# Patient Record
Sex: FEMALE | Race: WHITE | NOT HISPANIC OR LATINO | Employment: FULL TIME | URBAN - METROPOLITAN AREA
[De-identification: names, ages, dates, MRNs, and addresses within clinical notes are randomized per-mention and may not be internally consistent; named-entity substitution may affect disease eponyms.]

---

## 2018-04-17 PROBLEM — F41.9 ANXIETY: Status: ACTIVE | Noted: 2018-04-17

## 2021-04-16 ENCOUNTER — HOSPITAL ENCOUNTER (OUTPATIENT)
Dept: LAB | Facility: MEDICAL CENTER | Age: 26
End: 2021-04-16
Attending: INTERNAL MEDICINE
Payer: COMMERCIAL

## 2021-04-16 PROCEDURE — 36415 COLL VENOUS BLD VENIPUNCTURE: CPT

## 2021-04-16 PROCEDURE — 86357 NK CELLS TOTAL COUNT: CPT

## 2021-04-16 PROCEDURE — 80053 COMPREHEN METABOLIC PANEL: CPT

## 2021-04-16 PROCEDURE — 86359 T CELLS TOTAL COUNT: CPT

## 2021-04-16 PROCEDURE — 86790 VIRUS ANTIBODY NOS: CPT

## 2021-04-16 PROCEDURE — 87040 BLOOD CULTURE FOR BACTERIA: CPT

## 2021-04-16 PROCEDURE — 84439 ASSAY OF FREE THYROXINE: CPT

## 2021-04-16 PROCEDURE — 84481 FREE ASSAY (FT-3): CPT

## 2021-04-16 PROCEDURE — 84165 PROTEIN E-PHORESIS SERUM: CPT

## 2021-04-16 PROCEDURE — 85025 COMPLETE CBC W/AUTO DIFF WBC: CPT

## 2021-04-16 PROCEDURE — 84155 ASSAY OF PROTEIN SERUM: CPT

## 2021-04-16 PROCEDURE — 86769 SARS-COV-2 COVID-19 ANTIBODY: CPT

## 2021-04-16 PROCEDURE — 85652 RBC SED RATE AUTOMATED: CPT

## 2021-04-16 PROCEDURE — 86356 MONONUCLEAR CELL ANTIGEN: CPT

## 2021-04-16 PROCEDURE — 86140 C-REACTIVE PROTEIN: CPT

## 2021-04-17 LAB
ALBUMIN SERPL BCP-MCNC: 4.7 G/DL (ref 3.2–4.9)
ALBUMIN/GLOB SERPL: 1.6 G/DL
ALP SERPL-CCNC: 70 U/L (ref 30–99)
ALT SERPL-CCNC: 13 U/L (ref 2–50)
ANION GAP SERPL CALC-SCNC: 11 MMOL/L (ref 7–16)
AST SERPL-CCNC: 14 U/L (ref 12–45)
BASOPHILS # BLD AUTO: 1.4 % (ref 0–1.8)
BASOPHILS # BLD: 0.11 K/UL (ref 0–0.12)
BILIRUB SERPL-MCNC: 0.2 MG/DL (ref 0.1–1.5)
BUN SERPL-MCNC: 14 MG/DL (ref 8–22)
CALCIUM SERPL-MCNC: 9.3 MG/DL (ref 8.5–10.5)
CHLORIDE SERPL-SCNC: 105 MMOL/L (ref 96–112)
CO2 SERPL-SCNC: 22 MMOL/L (ref 20–33)
CREAT SERPL-MCNC: 0.92 MG/DL (ref 0.5–1.4)
CRP SERPL HS-MCNC: <0.3 MG/DL (ref 0–0.75)
EOSINOPHIL # BLD AUTO: 0.14 K/UL (ref 0–0.51)
EOSINOPHIL NFR BLD: 1.8 % (ref 0–6.9)
ERYTHROCYTE [DISTWIDTH] IN BLOOD BY AUTOMATED COUNT: 42.1 FL (ref 35.9–50)
ERYTHROCYTE [SEDIMENTATION RATE] IN BLOOD BY WESTERGREN METHOD: <1 MM/HOUR (ref 0–20)
FASTING STATUS PATIENT QL REPORTED: NORMAL
GLOBULIN SER CALC-MCNC: 3 G/DL (ref 1.9–3.5)
GLUCOSE SERPL-MCNC: 81 MG/DL (ref 65–99)
HCT VFR BLD AUTO: 47 % (ref 37–47)
HGB BLD-MCNC: 15.5 G/DL (ref 12–16)
IMM GRANULOCYTES # BLD AUTO: 0.02 K/UL (ref 0–0.11)
IMM GRANULOCYTES NFR BLD AUTO: 0.3 % (ref 0–0.9)
LYMPHOCYTES # BLD AUTO: 2.34 K/UL (ref 1–4.8)
LYMPHOCYTES NFR BLD: 29.8 % (ref 22–41)
MCH RBC QN AUTO: 31.5 PG (ref 27–33)
MCHC RBC AUTO-ENTMCNC: 33 G/DL (ref 33.6–35)
MCV RBC AUTO: 95.5 FL (ref 81.4–97.8)
MONOCYTES # BLD AUTO: 0.53 K/UL (ref 0–0.85)
MONOCYTES NFR BLD AUTO: 6.7 % (ref 0–13.4)
NEUTROPHILS # BLD AUTO: 4.72 K/UL (ref 2–7.15)
NEUTROPHILS NFR BLD: 60 % (ref 44–72)
NRBC # BLD AUTO: 0 K/UL
NRBC BLD-RTO: 0 /100 WBC
PLATELET # BLD AUTO: 276 K/UL (ref 164–446)
PMV BLD AUTO: 11.9 FL (ref 9–12.9)
POTASSIUM SERPL-SCNC: 4.2 MMOL/L (ref 3.6–5.5)
PROT SERPL-MCNC: 7.7 G/DL (ref 6–8.2)
RBC # BLD AUTO: 4.92 M/UL (ref 4.2–5.4)
SARS-COV-2 AB SERPL QL IA: NORMAL
SODIUM SERPL-SCNC: 138 MMOL/L (ref 135–145)
T3FREE SERPL-MCNC: 3.3 PG/ML (ref 2–4.4)
T4 FREE SERPL-MCNC: 1.11 NG/DL (ref 0.93–1.7)
WBC # BLD AUTO: 7.9 K/UL (ref 4.8–10.8)

## 2021-04-18 LAB — TEST NAME 95000: ABNORMAL

## 2021-04-19 ENCOUNTER — HOSPITAL ENCOUNTER (OUTPATIENT)
Dept: LAB | Facility: MEDICAL CENTER | Age: 26
End: 2021-04-19
Attending: INTERNAL MEDICINE
Payer: COMMERCIAL

## 2021-04-19 PROCEDURE — 36415 COLL VENOUS BLD VENIPUNCTURE: CPT

## 2021-04-19 PROCEDURE — 86359 T CELLS TOTAL COUNT: CPT

## 2021-04-19 PROCEDURE — 86357 NK CELLS TOTAL COUNT: CPT

## 2021-04-19 PROCEDURE — 87799 DETECT AGENT NOS DNA QUANT: CPT

## 2021-04-19 PROCEDURE — 86355 B CELLS TOTAL COUNT: CPT

## 2021-04-19 PROCEDURE — 87040 BLOOD CULTURE FOR BACTERIA: CPT

## 2021-04-19 PROCEDURE — 86360 T CELL ABSOLUTE COUNT/RATIO: CPT

## 2021-04-20 LAB
ALBUMIN SERPL ELPH-MCNC: 4.23 G/DL (ref 3.75–5.01)
ALPHA1 GLOB SERPL ELPH-MCNC: 0.3 G/DL (ref 0.19–0.46)
ALPHA2 GLOB SERPL ELPH-MCNC: 0.84 G/DL (ref 0.48–1.05)
B-GLOBULIN SERPL ELPH-MCNC: 0.84 G/DL (ref 0.48–1.1)
EER PROT ELECT SER Q1092: NORMAL
GAMMA GLOB SERPL ELPH-MCNC: 1.2 G/DL (ref 0.62–1.51)
INTERPRETATION SERPL IFE-IMP: NORMAL
PROT SERPL-MCNC: 7.4 G/DL (ref 6.3–8.2)

## 2021-04-21 LAB
ANNOTATION COMMENT IMP: NORMAL
CD19 CELLS NFR SPEC: 13 % (ref 6–23)
CD3 CELLS # BLD: 1644 CELLS/UL (ref 570–2400)
CD3 CELLS NFR SPEC: 81 % (ref 62–87)
CD3+CD4+ CELLS # BLD: 1007 CELLS/UL (ref 430–1800)
CD3+CD4+ CELLS NFR BLD: 50 % (ref 32–64)
CD3+CD4+ CELLS/CD3+CD8+ CLL BLD: 1.85 RATIO (ref 0.8–3.9)
CD3+CD8+ CELLS # BLD: 548 CELLS/UL (ref 210–1200)
CD3+CD8+ CELLS NFR SPEC: 27 % (ref 15–46)
CD3-CD16+CD56+ CELLS # SPEC: 93 CELLS/UL (ref 78–470)
CD3-CD16+CD56+ CELLS NFR SPEC: 5 % (ref 4–26)
CELLS.CD3-CD19+ [#/VOLUME] IN BLOOD: 273 CELLS/UL (ref 91–610)

## 2021-04-22 LAB
BACTERIA BLD CULT: NORMAL
HHV6 IGG TITR SER IF: ABNORMAL {TITER}
SIGNIFICANT IND 70042: NORMAL
SITE SITE: NORMAL
SOURCE SOURCE: NORMAL

## 2021-04-25 LAB
BACTERIA BLD CULT: NORMAL
SIGNIFICANT IND 70042: NORMAL
SITE SITE: NORMAL
SOURCE SOURCE: NORMAL

## 2021-04-27 LAB — TEST NAME 95000: NORMAL

## 2021-05-12 ENCOUNTER — HOSPITAL ENCOUNTER (OUTPATIENT)
Dept: LAB | Facility: MEDICAL CENTER | Age: 26
End: 2021-05-12
Attending: INTERNAL MEDICINE
Payer: COMMERCIAL

## 2021-05-12 LAB
APTT PPP: 29.3 SEC (ref 24.7–36)
BASOPHILS # BLD AUTO: 1 % (ref 0–1.8)
BASOPHILS # BLD: 0.06 K/UL (ref 0–0.12)
EOSINOPHIL # BLD AUTO: 0.06 K/UL (ref 0–0.51)
EOSINOPHIL NFR BLD: 1 % (ref 0–6.9)
ERYTHROCYTE [DISTWIDTH] IN BLOOD BY AUTOMATED COUNT: 39.7 FL (ref 35.9–50)
HCT VFR BLD AUTO: 44.2 % (ref 37–47)
HGB BLD-MCNC: 14.9 G/DL (ref 12–16)
IMM GRANULOCYTES # BLD AUTO: 0.02 K/UL (ref 0–0.11)
IMM GRANULOCYTES NFR BLD AUTO: 0.3 % (ref 0–0.9)
INR PPP: 1.04 (ref 0.87–1.13)
LYMPHOCYTES # BLD AUTO: 1.67 K/UL (ref 1–4.8)
LYMPHOCYTES NFR BLD: 27.7 % (ref 22–41)
MCH RBC QN AUTO: 31.2 PG (ref 27–33)
MCHC RBC AUTO-ENTMCNC: 33.7 G/DL (ref 33.6–35)
MCV RBC AUTO: 92.5 FL (ref 81.4–97.8)
MONOCYTES # BLD AUTO: 0.4 K/UL (ref 0–0.85)
MONOCYTES NFR BLD AUTO: 6.6 % (ref 0–13.4)
NEUTROPHILS # BLD AUTO: 3.81 K/UL (ref 2–7.15)
NEUTROPHILS NFR BLD: 63.4 % (ref 44–72)
NRBC # BLD AUTO: 0 K/UL
NRBC BLD-RTO: 0 /100 WBC
PLATELET # BLD AUTO: 269 K/UL (ref 164–446)
PMV BLD AUTO: 10.6 FL (ref 9–12.9)
PROTHROMBIN TIME: 13.3 SEC (ref 12–14.6)
RBC # BLD AUTO: 4.78 M/UL (ref 4.2–5.4)
WBC # BLD AUTO: 6 K/UL (ref 4.8–10.8)

## 2021-05-12 PROCEDURE — 85025 COMPLETE CBC W/AUTO DIFF WBC: CPT

## 2021-05-12 PROCEDURE — 36415 COLL VENOUS BLD VENIPUNCTURE: CPT

## 2021-05-12 PROCEDURE — 85610 PROTHROMBIN TIME: CPT

## 2021-05-12 PROCEDURE — 85730 THROMBOPLASTIN TIME PARTIAL: CPT

## 2021-05-14 ENCOUNTER — HOSPITAL ENCOUNTER (OUTPATIENT)
Dept: RADIOLOGY | Facility: MEDICAL CENTER | Age: 26
End: 2021-05-14
Attending: INTERNAL MEDICINE
Payer: COMMERCIAL

## 2021-05-14 ENCOUNTER — HOSPITAL ENCOUNTER (OUTPATIENT)
Dept: LAB | Facility: MEDICAL CENTER | Age: 26
End: 2021-05-14
Attending: INTERNAL MEDICINE
Payer: COMMERCIAL

## 2021-05-14 DIAGNOSIS — G35 MULTIPLE SCLEROSIS (HCC): ICD-10-CM

## 2021-05-14 LAB
APTT PPP: 25 SEC (ref 24.7–36)
BASOPHILS # BLD AUTO: 1.2 % (ref 0–1.8)
BASOPHILS # BLD: 0.07 K/UL (ref 0–0.12)
BURR CELLS/RBC NFR CSF MANUAL: 0 %
CLARITY CSF: CLEAR
COLOR CSF: COLORLESS
COLOR SPUN CSF: COLORLESS
EOSINOPHIL # BLD AUTO: 0.1 K/UL (ref 0–0.51)
EOSINOPHIL NFR BLD: 1.7 % (ref 0–6.9)
ERYTHROCYTE [DISTWIDTH] IN BLOOD BY AUTOMATED COUNT: 40.4 FL (ref 35.9–50)
GLUCOSE CSF-MCNC: 58 MG/DL (ref 40–80)
HCT VFR BLD AUTO: 42 % (ref 37–47)
HGB BLD-MCNC: 14.2 G/DL (ref 12–16)
IMM GRANULOCYTES # BLD AUTO: 0.02 K/UL (ref 0–0.11)
IMM GRANULOCYTES NFR BLD AUTO: 0.3 % (ref 0–0.9)
INR PPP: 1.02 (ref 0.87–1.13)
LACTATE BLD-SCNC: 1.4 MMOL/L (ref 0.5–2)
LYMPHOCYTES # BLD AUTO: 1.93 K/UL (ref 1–4.8)
LYMPHOCYTES NFR BLD: 32.1 % (ref 22–41)
LYMPHOCYTES NFR CSF: 92 %
MCH RBC QN AUTO: 31.3 PG (ref 27–33)
MCHC RBC AUTO-ENTMCNC: 33.8 G/DL (ref 33.6–35)
MCV RBC AUTO: 92.7 FL (ref 81.4–97.8)
MONOCYTES # BLD AUTO: 0.45 K/UL (ref 0–0.85)
MONOCYTES NFR BLD AUTO: 7.5 % (ref 0–13.4)
MONONUC CELLS NFR CSF: 8 %
NEUTROPHILS # BLD AUTO: 3.44 K/UL (ref 2–7.15)
NEUTROPHILS NFR BLD: 57.2 % (ref 44–72)
NRBC # BLD AUTO: 0 K/UL
NRBC BLD-RTO: 0 /100 WBC
PLATELET # BLD AUTO: 259 K/UL (ref 164–446)
PMV BLD AUTO: 10.9 FL (ref 9–12.9)
PROT CSF-MCNC: 21 MG/DL (ref 15–45)
PROTHROMBIN TIME: 13.1 SEC (ref 12–14.6)
RBC # BLD AUTO: 4.53 M/UL (ref 4.2–5.4)
RBC # CSF: <1 CELLS/UL
SPECIMEN VOL CSF: 18 ML
TUBE # CSF: 4
TUBE # CSF: 4
WBC # BLD AUTO: 6 K/UL (ref 4.8–10.8)
WBC # CSF: 1 CELLS/UL (ref 0–10)

## 2021-05-14 PROCEDURE — 85610 PROTHROMBIN TIME: CPT

## 2021-05-14 PROCEDURE — 87533 HHV-6 DNA QUANT: CPT

## 2021-05-14 PROCEDURE — 88108 CYTOPATH CONCENTRATE TECH: CPT

## 2021-05-14 PROCEDURE — 82784 ASSAY IGA/IGD/IGG/IGM EACH: CPT

## 2021-05-14 PROCEDURE — 82945 GLUCOSE OTHER FLUID: CPT

## 2021-05-14 PROCEDURE — 306637 DX-LUMBAR PUNCTURE FOR DIAGNOSIS

## 2021-05-14 PROCEDURE — 82040 ASSAY OF SERUM ALBUMIN: CPT

## 2021-05-14 PROCEDURE — 62270 DX LMBR SPI PNXR: CPT

## 2021-05-14 PROCEDURE — 87799 DETECT AGENT NOS DNA QUANT: CPT

## 2021-05-14 PROCEDURE — 83916 OLIGOCLONAL BANDS: CPT

## 2021-05-14 PROCEDURE — 89051 BODY FLUID CELL COUNT: CPT

## 2021-05-14 PROCEDURE — 82042 OTHER SOURCE ALBUMIN QUAN EA: CPT

## 2021-05-14 PROCEDURE — 36415 COLL VENOUS BLD VENIPUNCTURE: CPT

## 2021-05-14 PROCEDURE — 62328 DX LMBR SPI PNXR W/FLUOR/CT: CPT

## 2021-05-14 PROCEDURE — 85025 COMPLETE CBC W/AUTO DIFF WBC: CPT

## 2021-05-14 PROCEDURE — 83605 ASSAY OF LACTIC ACID: CPT

## 2021-05-14 PROCEDURE — 84157 ASSAY OF PROTEIN OTHER: CPT

## 2021-05-14 PROCEDURE — 85730 THROMBOPLASTIN TIME PARTIAL: CPT

## 2021-05-14 RX ORDER — LIDOCAINE HYDROCHLORIDE 10 MG/ML
INJECTION, SOLUTION INFILTRATION; PERINEURAL
Status: DISCONTINUED
Start: 2021-05-14 | End: 2021-05-15 | Stop reason: HOSPADM

## 2021-05-16 ENCOUNTER — HOSPITAL ENCOUNTER (EMERGENCY)
Facility: MEDICAL CENTER | Age: 26
End: 2021-05-17
Attending: EMERGENCY MEDICINE
Payer: COMMERCIAL

## 2021-05-16 DIAGNOSIS — G44.89 OTHER HEADACHE SYNDROME: ICD-10-CM

## 2021-05-16 PROCEDURE — 700111 HCHG RX REV CODE 636 W/ 250 OVERRIDE (IP): Performed by: EMERGENCY MEDICINE

## 2021-05-16 PROCEDURE — 99284 EMERGENCY DEPT VISIT MOD MDM: CPT

## 2021-05-16 PROCEDURE — 700105 HCHG RX REV CODE 258: Performed by: EMERGENCY MEDICINE

## 2021-05-16 PROCEDURE — 96374 THER/PROPH/DIAG INJ IV PUSH: CPT

## 2021-05-16 PROCEDURE — 96375 TX/PRO/DX INJ NEW DRUG ADDON: CPT

## 2021-05-16 RX ORDER — MORPHINE SULFATE 4 MG/ML
4 INJECTION, SOLUTION INTRAMUSCULAR; INTRAVENOUS ONCE
Status: COMPLETED | OUTPATIENT
Start: 2021-05-16 | End: 2021-05-16

## 2021-05-16 RX ORDER — FLUOXETINE HYDROCHLORIDE 20 MG/1
60 CAPSULE ORAL DAILY
Status: SHIPPED | COMMUNITY
End: 2024-02-14

## 2021-05-16 RX ORDER — KETOROLAC TROMETHAMINE 30 MG/ML
30 INJECTION, SOLUTION INTRAMUSCULAR; INTRAVENOUS ONCE
Status: DISCONTINUED | OUTPATIENT
Start: 2021-05-16 | End: 2021-05-16

## 2021-05-16 RX ORDER — SODIUM CHLORIDE 9 MG/ML
1000 INJECTION, SOLUTION INTRAVENOUS ONCE
Status: COMPLETED | OUTPATIENT
Start: 2021-05-16 | End: 2021-05-17

## 2021-05-16 RX ORDER — ONDANSETRON 2 MG/ML
4 INJECTION INTRAMUSCULAR; INTRAVENOUS ONCE
Status: COMPLETED | OUTPATIENT
Start: 2021-05-16 | End: 2021-05-16

## 2021-05-16 RX ADMIN — SODIUM CHLORIDE 1000 ML: 9 INJECTION, SOLUTION INTRAVENOUS at 23:22

## 2021-05-16 RX ADMIN — ONDANSETRON 4 MG: 2 INJECTION INTRAMUSCULAR; INTRAVENOUS at 23:23

## 2021-05-16 RX ADMIN — MORPHINE SULFATE 4 MG: 4 INJECTION INTRAVENOUS at 23:24

## 2021-05-16 ASSESSMENT — FIBROSIS 4 INDEX: FIB4 SCORE: 0.37

## 2021-05-16 ASSESSMENT — PAIN DESCRIPTION - PAIN TYPE: TYPE: ACUTE PAIN

## 2021-05-17 ENCOUNTER — ANESTHESIA (OUTPATIENT)
Dept: ANESTHESIOLOGY | Facility: MEDICAL CENTER | Age: 26
End: 2021-05-17
Payer: COMMERCIAL

## 2021-05-17 ENCOUNTER — ANESTHESIA EVENT (OUTPATIENT)
Dept: ANESTHESIOLOGY | Facility: MEDICAL CENTER | Age: 26
End: 2021-05-17
Payer: COMMERCIAL

## 2021-05-17 ENCOUNTER — HOSPITAL ENCOUNTER (EMERGENCY)
Facility: MEDICAL CENTER | Age: 26
End: 2021-05-17
Attending: EMERGENCY MEDICINE
Payer: COMMERCIAL

## 2021-05-17 VITALS
OXYGEN SATURATION: 98 % | WEIGHT: 133.6 LBS | BODY MASS INDEX: 22.26 KG/M2 | TEMPERATURE: 97.8 F | DIASTOLIC BLOOD PRESSURE: 60 MMHG | HEIGHT: 65 IN | RESPIRATION RATE: 15 BRPM | SYSTOLIC BLOOD PRESSURE: 105 MMHG | HEART RATE: 72 BPM

## 2021-05-17 VITALS
DIASTOLIC BLOOD PRESSURE: 81 MMHG | SYSTOLIC BLOOD PRESSURE: 131 MMHG | BODY MASS INDEX: 21.66 KG/M2 | WEIGHT: 130 LBS | TEMPERATURE: 96.4 F | OXYGEN SATURATION: 100 % | HEIGHT: 65 IN | RESPIRATION RATE: 16 BRPM | HEART RATE: 77 BPM

## 2021-05-17 DIAGNOSIS — R51.9 NONINTRACTABLE HEADACHE, UNSPECIFIED CHRONICITY PATTERN, UNSPECIFIED HEADACHE TYPE: ICD-10-CM

## 2021-05-17 PROCEDURE — 700105 HCHG RX REV CODE 258: Performed by: EMERGENCY MEDICINE

## 2021-05-17 PROCEDURE — 96375 TX/PRO/DX INJ NEW DRUG ADDON: CPT

## 2021-05-17 PROCEDURE — 96365 THER/PROPH/DIAG IV INF INIT: CPT

## 2021-05-17 PROCEDURE — 62273 INJECT EPIDURAL PATCH: CPT

## 2021-05-17 PROCEDURE — 96374 THER/PROPH/DIAG INJ IV PUSH: CPT

## 2021-05-17 PROCEDURE — 700111 HCHG RX REV CODE 636 W/ 250 OVERRIDE (IP): Performed by: EMERGENCY MEDICINE

## 2021-05-17 PROCEDURE — 99284 EMERGENCY DEPT VISIT MOD MDM: CPT

## 2021-05-17 PROCEDURE — 700111 HCHG RX REV CODE 636 W/ 250 OVERRIDE (IP)

## 2021-05-17 RX ORDER — SODIUM CHLORIDE, SODIUM LACTATE, POTASSIUM CHLORIDE, CALCIUM CHLORIDE 600; 310; 30; 20 MG/100ML; MG/100ML; MG/100ML; MG/100ML
1000 INJECTION, SOLUTION INTRAVENOUS ONCE
Status: COMPLETED | OUTPATIENT
Start: 2021-05-17 | End: 2021-05-17

## 2021-05-17 RX ORDER — MORPHINE SULFATE 4 MG/ML
4 INJECTION, SOLUTION INTRAMUSCULAR; INTRAVENOUS ONCE
Status: COMPLETED | OUTPATIENT
Start: 2021-05-17 | End: 2021-05-17

## 2021-05-17 RX ORDER — CEFAZOLIN SODIUM 2 G/100ML
2 INJECTION, SOLUTION INTRAVENOUS ONCE
Status: COMPLETED | OUTPATIENT
Start: 2021-05-17 | End: 2021-05-17

## 2021-05-17 RX ORDER — ONDANSETRON 2 MG/ML
4 INJECTION INTRAMUSCULAR; INTRAVENOUS ONCE
Status: COMPLETED | OUTPATIENT
Start: 2021-05-17 | End: 2021-05-17

## 2021-05-17 RX ORDER — ONDANSETRON 4 MG/1
4 TABLET, ORALLY DISINTEGRATING ORAL ONCE
Status: COMPLETED | OUTPATIENT
Start: 2021-05-17 | End: 2021-05-17

## 2021-05-17 RX ADMIN — ONDANSETRON 4 MG: 2 INJECTION INTRAMUSCULAR; INTRAVENOUS at 15:32

## 2021-05-17 RX ADMIN — MORPHINE SULFATE 4 MG: 4 INJECTION INTRAVENOUS at 15:31

## 2021-05-17 RX ADMIN — CEFAZOLIN SODIUM 2 G: 2 INJECTION, SOLUTION INTRAVENOUS at 16:00

## 2021-05-17 RX ADMIN — ONDANSETRON 4 MG: 4 TABLET, ORALLY DISINTEGRATING ORAL at 14:51

## 2021-05-17 RX ADMIN — SODIUM CHLORIDE, POTASSIUM CHLORIDE, SODIUM LACTATE AND CALCIUM CHLORIDE 1000 ML: 600; 310; 30; 20 INJECTION, SOLUTION INTRAVENOUS at 15:29

## 2021-05-17 ASSESSMENT — PAIN DESCRIPTION - PAIN TYPE
TYPE: ACUTE PAIN
TYPE: ACUTE PAIN

## 2021-05-17 ASSESSMENT — FIBROSIS 4 INDEX: FIB4 SCORE: 0.37

## 2021-05-17 NOTE — ED NOTES
Blood patch perfomed by Anesthesia. 12cc used to perform procedure. Patient reports HA improvement from 10/10 pain to 3/10 pain. Patient tolerated procedure well, is sitting up in stretcher in NAD. Call bell within reach, will continue to monitor

## 2021-05-17 NOTE — ANESTHESIA TIME REPORT
Anesthesia Start and Stop Event Times     Date Time Event    5/17/2021 1610 Ready for Procedure     1613 Anesthesia Start     1635 Anesthesia Stop        Responsible Staff    No responsible staff documented.       Preop Diagnosis (Free Text):  Pre-op Diagnosis             Preop Diagnosis (Codes):    Post op Diagnosis  Post-dural puncture headache      Premium Reason  A. 3PM - 7AM    Comments:

## 2021-05-17 NOTE — ED PROVIDER NOTES
"ED Provider Note    CHIEF COMPLAINT  Chief Complaint   Patient presents with   • Headache       HPI  Scarlet Dhillon is a 25 y.o. female who presents with headache, she had a lumbar puncture done 3 days ago at [facility] to check for parvovirus infection.  After that she developed headache which is much worse when standing.  She has had nausea and vomiting with no focal neurologic deficits.  She presented yesterday at [facility] and at that time a consult was put out to anesthesia who said they could not do the procedure and that she should present to [facility] today if the symptoms do not resolve with pain medication.  Patient returns with the same symptoms.    REVIEW OF SYSTEMS  See HPI for further details. All other systems are negative.     PAST MEDICAL HISTORY   has a past medical history of Anxiety.    SOCIAL HISTORY  Social History     Tobacco Use   • Smoking status: Never Smoker   • Smokeless tobacco: Never Used   Vaping Use   • Vaping Use: Never used   Substance and Sexual Activity   • Alcohol use: No   • Drug use: No   • Sexual activity: Never     Comment: and lives at home with father and his parents.       SURGICAL HISTORY  patient denies any surgical history    CURRENT MEDICATIONS  Prozac, Cytomel, Wellbutrin, Synthroid, Xanax, Depo-Provera    ALLERGIES  Allergies   Allergen Reactions   • Pcn [Penicillins]        PHYSICAL EXAM  VITAL SIGNS: /81   Pulse 77   Temp (!) 35.8 °C (96.4 °F) (Temporal)   Resp 16   Ht 1.651 m (5' 5\") Comment: Simultaneous filing. User may not have seen previous data.  Wt 59 kg (130 lb)   SpO2 100%   BMI 21.63 kg/m²  @[id number]::@   Pulse ox interpretation: I interpret this pulse ox as normal.  Constitutional: Alert.  HENT: No signs of trauma, Bilateral external ears normal, Nose normal.   Eyes: Pupils are equal and reactive, Conjunctiva normal, Non-icteric.   Neck: Normal range of motion, No tenderness, Supple, No stridor.   Lymphatic: No " lymphadenopathy noted.   Cardiovascular: Regular rate and rhythm, no murmurs.   Thorax & Lungs: Normal breath sounds, No respiratory distress, No wheezing, No chest tenderness.   Abdomen: Bowel sounds normal, Soft, No tenderness, No masses, No pulsatile masses. No peritoneal signs.  Skin: Warm, Dry, No erythema, No rash.   Back: No bony tenderness, No CVA tenderness.   Extremities: Intact distal pulses, No edema, No tenderness, No cyanosis.  Musculoskeletal: Good range of motion in all major joints. No tenderness to palpation or major deformities noted.   Neurologic: Alert , Normal motor function, Normal sensory function, No focal deficits noted.   Psychiatric: Affect normal, Judgment normal, Mood normal.           COURSE & MEDICAL DECISION MAKING  Pertinent Labs & Imaging studies reviewed. (See chart for details)    IV was established, the patient was given 4 mg IV morphine and 4 mg IV Zofran.  She was given 1 L LR IV.    Blood patch was performed by anesthesiologist, the patient now has resolution of headache.  She is discharged to follow-up with her primary care doctor as needed and to return if worse.     The patient will return for new or worsening symptoms and is stable at the time of discharge.    The patient is referred to a primary physician for blood pressure management, diabetic screening, and for all other preventative health concerns.        DISPOSITION:  Patient will be discharged home in stable condition.    FOLLOW UP:  [facility], Emergency Dept  [address] [address] [phone]    If symptoms worsen    Armin Tillman D.O.  [address]  [facility] [id number] [phone]      As needed      OUTPATIENT MEDICATIONS:  New Prescriptions    No medications on file        The patient will return for worsening symptoms and is stable at the time of discharge. The patient verbalizes understanding and will comply.    FINAL IMPRESSION  1.  Post spinal  headache  2.   3.         Electronically signed by: Hemanth Vazquez M.D., 5/17/2021 3:26 PM

## 2021-05-17 NOTE — ED TRIAGE NOTES
"Chief Complaint   Patient presents with   • Headache     diagnostic lumbar puncture 5/14/21. intermittent since 5/14. 10/10 frontal \"pounding\" headache     /95   Pulse 85   Temp 36.6 °C (97.8 °F) (Temporal)   Resp 16   Ht 1.651 m (5' 5\")   Wt 60.6 kg (133 lb 9.6 oz)   SpO2 96%   Breastfeeding No   BMI 22.23 kg/m²     Pt reports intermittent nausea w/o vomiting and dizziness w/o syncope. Took ibu yesterday and today without any headache relief. Alleviating factors: laying down; aggravating: sitting/standing  "

## 2021-05-17 NOTE — PROGRESS NOTES
Discussed with ER physician Dr. Ayala re: patient care regarding recent LP and subsequent headache, likely PDPH in nature.  Patient getting worked up for MS, had an LP on Friday, now returns with 10/10 positional headache to [facility]. I advised Dr. Ayala that I was in the OR and could not guarantee I could come at any point tonight depending on OR cases, and that we dont usually perform urgent blood patches at [facility].  I advised supportive measures like hydration, NSAIDs, and laying flat.  Also advised a single dose of gabapentin 600 mg and/or cosyntropin 1 mg IV could be be beneficial.  If symptoms persist, patient advised to return to  ER at [facility] or [facility].  Dr. Ayala agreed with plan and will advise patient as such.      Bassam Chung MD  Anesthesiology.

## 2021-05-17 NOTE — ED NOTES
Patient discharged home in stable condition with significant other  AVS provided with recommended follow up and home care instructions and education information  No prescriptions provided at this time  Patient and significant other verbalized understanding  Ambulatory at time of discharge

## 2021-05-17 NOTE — ED PROVIDER NOTES
"ED Provider Note    CHIEF COMPLAINT  Chief Complaint   Patient presents with   • Headache     diagnostic lumbar puncture 5/14/21. intermittent since 5/14. 10/10 frontal \"pounding\" headache       HPI  Scarlet Dhillon is a 25 y.o. female who presents to the emergency department for a headache.  She underwent a diagnostic lumbar puncture on the 14th for concern that she has parvovirus and may have infection in her CSF.  Since that day she has had a 10 out of 10 frontal headache that is worse with standing.  She is not on any anticoagulation she denies any trauma there is been no other acute symptoms    REVIEW OF SYSTEMSSee HPI for further details. All other systems are negative.     PAST MEDICAL HISTORY   has a past medical history of Anxiety.    Patient denies history of cancer or trauma    FAMILY HISTORY  Family History   Problem Relation Age of Onset   • No Known Problems Mother    • No Known Problems Father        SOCIAL HISTORY  Social History     Tobacco Use   • Smoking status: Never Smoker   • Smokeless tobacco: Never Used   Vaping Use   • Vaping Use: Never used   Substance and Sexual Activity   • Alcohol use: No   • Drug use: No   • Sexual activity: Never     Comment: and lives at home with father and his parents.       No methamphetamine or cocaine use.    SURGICAL HISTORY  patient denies any surgical history    CURRENT MEDICATIONS  Home Medications    **Home medications have not yet been reviewed for this encounter**         ALLERGIES  Allergies   Allergen Reactions   • Pcn [Penicillins]        PHYSICAL EXAM  VITAL SIGNS: /95   Pulse 85   Temp 36.6 °C (97.8 °F) (Temporal)   Resp 16   Ht 1.651 m (5' 5\")   Wt 60.6 kg (133 lb 9.6 oz)   SpO2 96%   Breastfeeding No   BMI 22.23 kg/m²   Constitutional:  Well developed, Well nourished, Appears uncomfortable.   HENT: Pleasant, smiling  Eyes: PERRLA, EOMI, Conjunctiva normal, No discharge.   Neck: Normal range of motion, No tenderness, Supple, No " stridor.   Lymphatic: No lymphadenopathy noted.   Cardiovascular: Normal heart rate, Normal rhythm, No murmurs, No rubs, No gallops.   Thorax & Lungs: Normal breath sounds, No respiratory distress, No wheezing, No chest tenderness.   Skin: Warm, Dry, No erythema, No rash. No petechiae or purpura  Extremities: Intact distal pulses, No edema, No tenderness, No cyanosis, No clubbing.   Neurologic: Alert & oriented x 3, Normal motor function, Normal sensory function, No focal deficits noted.   Psychiatric: Affect normal, Judgment normal, Mood normal.         COURSE & MEDICAL DECISION MAKING  Pertinent Labs & Imaging studies reviewed. (See chart for details)    Patient presents with a headache that is consistent with a post LP headache.  I spoke with anesthesia and there are no anesthesiologist able to do a blood patch at this time.  They recommended IV fluids and pain medication if she still having pain in the morning if she goes to [facility] at 7 AM they will be able to place a blood patch.  The patient was happy with this plan IV morphine and Toradol and IV fluids were ordered the patient will be discharged after this with the understanding that she needs to go for a blood patch if continued headache tomorrow morning at [facility]        FINAL IMPRESSION  1.  Post LP headache.      Electronically signed by: Rubina Ayala M.D., 5/16/2021 11:08 PM

## 2021-05-17 NOTE — ED TRIAGE NOTES
Had LP 3 days ago, headache since then.  Seen at [facility] last night for same, told to come here for blood patch.   Also reports nausea

## 2021-05-17 NOTE — ED NOTES
Pt ambulated to room with RN; pt reports having lumbar puncture on Friday. Pt states headaches started yesterday. Pt reports being seen at  yesterday but was told that they did not have the staff to perform a blood patch. Pt returned today for no improvement in symptoms.

## 2021-05-17 NOTE — ANESTHESIA PROCEDURE NOTES
Blood Patch  Performed by: Chele Ramey M.D.  Authorized by: Chele Ramey M.D.     Patient Location:  ED  Start Time:  5/17/2021 4:13 PM  End Time:  5/17/2021 4:29 PM  Reason for Blood Patch: spinal headache    2 patient identifiers, IV checked, site marked, risks and benefits discussed, surgical consent, monitors and equipment checked, pre-op evaluation, timeout performed and anesthesia consent    Volume of Blood Injected:  12  Patient Position:  Sitting  Prep: povidone-iodine    Monitoring:  Continuous pulse oximetry and blood pressure monitoring  Approach:  Midline  Location:  L4-5  Injection Technique:  SUSANA air  Injection Method:  Touhy needle  Needle Gauge:  18  Needle Length (in):  3.5  Loss of Resistance:  4  Evidence of CSF/Blood?:  No  Venous Blood Drawn By::  Nurse  Sterile Technique on Blood Draw?:  Yes  Volume:  15  Events: well tolerated     Pt in sitting position. P/D in usual sterile manner. Once the epidural space identified by SUSANA 15 cc of blood drawn. 10 cc of blood injected and pt stated that pain at 5/10. 2 more cc injected pain 3/10. Pt joe procedure well.

## 2021-05-18 LAB — CYTOLOGY REG CYTOL: NORMAL

## 2021-05-18 NOTE — DISCHARGE INSTRUCTIONS
Spinal Headache    A spinal headache is a severe headache that can happen after a person has had a lumbar puncture or epidural anesthesia. During these procedures, a needle is passed between the bones of the spine. The headache usually starts from a few hours to 1-2 days after that. The headache can last for a few days. In rare cases, it can last for more than a week.  What are the causes?  This condition is caused by a leak of spinal fluid from the spine through the hole that is left by the needle.  What are the signs or symptoms?  Symptoms of this condition include:  · A severe headache.  · A headache that is worse when you sit or stand and better when you lie down.  · Neck pain and stiffness, especially when tilting your chin toward your chest.  · Nausea and vomiting.  How is this diagnosed?  This condition is usually diagnosed based on:  · Your medical history.  · Your symptoms.  · A CT scan or MRI of the brain to help rule out other conditions.  How is this treated?  Treatment for this condition may include:  · Replacing fluids that leaked out through the needle hole. Fluids may be replaced by:  ? Drinking more fluids.   ? Getting fluids through an IV line that is inserted into one of your veins.  · Caffeine to help reduce your headache. Your health care provider may recommend drinking caffeinated beverages such as soda, coffee, or tea.  · Having an epidural blood patch procedure. In this procedure, a small amount of your blood is injected into the area of the leak in order to seal it.  · Medicines for pain.  · Resting and lying flat for a few days.  Follow these instructions at home:         · Take over-the-counter and prescription medicines only as told by your health care provider.  · Drink enough fluids to keep your urine pale yellow.  · Drink caffeinated beverages as told by your health care provider.  · Lie down to relieve pain if your pain gets worse when you sit or stand.  · Return to your normal  activities as told by your health care provider. Ask your health care provider what activities are safe for you.  · Keep all follow-up visits as told by your health care provider. This is important.  Contact a health care provider if:  · You develop nausea and vomiting.  Get help right away if:  · Your pain becomes very severe.  · Your pain cannot be controlled.  · You develop a fever.  · You have a stiff neck.  · You develop problems with your vision.  · You lose control of your bowel or bladder (have incontinence).  · You have trouble walking, you feel weak, or you lose feeling in part of your body.  Summary  · A spinal headache is a severe headache that can happen after a person has had a lumbar puncture or epidural anesthesia.  · This condition is caused by a leak of spinal fluid from the spine through the hole that is left by the needle. The headache can last for a few days. In rare cases, it lasts for more than a week.  · Supportive measures, such as drinking more fluid and taking pain medicines, are usually recommended. In some cases, it may be necessary to inject a small amount of your blood to seal the leak.  This information is not intended to replace advice given to you by your health care provider. Make sure you discuss any questions you have with your health care provider.  Document Released: 06/09/2003 Document Revised: 01/31/2019 Document Reviewed: 01/30/2019  Elsevier Patient Education © 2020 [employer]

## 2021-05-18 NOTE — ED NOTES
Pt discharged; Pt verbalized understadning of discharge paperwork as well as signs and symptoms for which to return to the ED. Pt discharged to the care of family for transport home.

## 2021-05-22 LAB
HHV6 DNA # SPEC NAA+PROBE: <1000 CPY/ML
HHV6 DNA SPEC NAA+PROBE-LOG#: <3 LOG
HHV6 DNA SPEC NAA+PROBE: NORMAL
HHV6 IGG+IGM SER-IMP: NOT DETECTED
SPECIMEN SOURCE: NORMAL

## 2021-05-23 LAB
DIAGNOSTIC IMP SPEC-IMP: NOT DETECTED
EBV DNA # SPEC NAA+PROBE: <390 CPY/ML
EBV DNA SPEC NAA+PROBE-LOG#: <2.6 LOG
SPECIMEN SOURCE: NORMAL

## 2021-05-24 LAB
ALB CSF/SERPL: 3.4 RATIO (ref 0–9)
ALBUMIN CSF-MCNC: 15 MG/DL (ref 0–35)
ALBUMIN SERPL-MCNC: 4361 MG/DL (ref 3500–5200)
IGG CSF-MCNC: 2.3 MG/DL (ref 0–6)
IGG SERPL-MCNC: 1110 MG/DL (ref 768–1632)
IGG SYNTH RATE SER+CSF CALC-MRATE: <0 MG/D
IGG/ALB CLEAR SER+CSF-RTO: 0.6 RATIO (ref 0.28–0.66)
IGG/ALB CSF: 0.15 RATIO (ref 0.09–0.25)
OLIGOCLONAL BANDS CSF ELPH-IMP: NEGATIVE
OLIGOCLONAL BANDS CSF ELPH-IMP: NORMAL
OLIGOCLONAL BANDS CSF IEF: 0 BANDS (ref 0–1)

## 2021-06-22 ENCOUNTER — TELEPHONE (OUTPATIENT)
Dept: SCHEDULING | Facility: IMAGING CENTER | Age: 26
End: 2021-06-22

## 2021-06-22 ENCOUNTER — TELEPHONE (OUTPATIENT)
Dept: MEDICAL GROUP | Facility: PHYSICIAN GROUP | Age: 26
End: 2021-06-22

## 2021-06-22 ENCOUNTER — TELEMEDICINE (OUTPATIENT)
Dept: MEDICAL GROUP | Facility: PHYSICIAN GROUP | Age: 26
End: 2021-06-22
Payer: COMMERCIAL

## 2021-06-22 VITALS — WEIGHT: 130 LBS | RESPIRATION RATE: 16 BRPM | BODY MASS INDEX: 21.66 KG/M2 | HEIGHT: 65 IN

## 2021-06-22 DIAGNOSIS — F41.9 ANXIETY: ICD-10-CM

## 2021-06-22 DIAGNOSIS — R53.82 CHRONIC FATIGUE: ICD-10-CM

## 2021-06-22 DIAGNOSIS — B34.3 PARVOVIRUS INFECTION: ICD-10-CM

## 2021-06-22 DIAGNOSIS — D83.1: ICD-10-CM

## 2021-06-22 PROCEDURE — 99204 OFFICE O/P NEW MOD 45 MIN: CPT | Performed by: INTERNAL MEDICINE

## 2021-06-22 ASSESSMENT — PATIENT HEALTH QUESTIONNAIRE - PHQ9: CLINICAL INTERPRETATION OF PHQ2 SCORE: 0

## 2021-06-22 ASSESSMENT — FIBROSIS 4 INDEX: FIB4 SCORE: 0.37

## 2021-06-23 NOTE — TELEPHONE ENCOUNTER
Per Dr. Goodwin, left vm with pt to let her know that referral's to Infectious Disease and Hematology have been put in.  If pt does not hear from them in next week please call [phone] and ask for referrals.  Also asked pt to call back or send [employer] message with information of other physician's she is seeing so records can be requested and input into her chart at [facility].

## 2021-06-23 NOTE — PROGRESS NOTES
"This visit was conducted via  Zoom Video Virtual Visit using secure and encrypted videoconferencing technology.   The patient's identity was confirmed and verbal consent was obtained for this virtual visit.  -------------------------------------------------------------------------------       CC:   Was diagnosed with T-cell canal rearrangement and parvovirus infection      HPI: This is a 25 y.o. pt.     Today is a new visit with the patient  Patient reported that she has had chronic fatigue for several months associated with diffuse myalgia arthralgia.  Patient reported that she has had extensive work-up done ordered by her previous specialist Dr. Cecilio Sepulveda outside of [facility]  Apparently the patient reported that she has had several lab draws .  Patient also underwent lumbar puncture recently    We do not have any medical record for review at this time    Patient stated that she was diagnosed with \" T-cell canal rearrangement\" and parvovirus infection.    The patient stated that she was supposed to get \"treatment\" however patient stated that additional testing needs to be done before treatment can be given.    Patient stated that she became frustrated with the waiting time and wishes to seek a second opinion from [facility].    Past medical history is significant for chronic anxiety for which she is seeing behavioral health specialist at the present time.  Patient presently taking alprazolam Wellbutrin Prozac.    She also has hypothyroidism currently taking levothyroxine and Cytomel.        Family history noncontributory    Social history: The patient does not smoke or drink alcohol.  Denies illegal drug use          REVIEW OF SYSTEMS:     Constitutional:  no fever / chills   Neurologic: no headache  ENT: no sore throat, no hearing loss  CV:  no chest pain, no palpitations  Pulmonary: no SOB, no cough          Allergies: Pcn [penicillins]    Current Outpatient Medications Ordered in Epic " "  Medication Sig Dispense Refill   • FLUoxetine (PROZAC) 20 MG Cap Take 60 mg by mouth every day. Indications: Depression     • liothyronine (CYTOMEL) 5 MCG Tab Take 5 mcg by mouth every day.     • buPROPion SR (WELLBUTRIN-SR) 150 MG TABLET SR 12 HR sustained-release tablet Take 150 mg by mouth 2 times a day.     • levothyroxine (SYNTHROID) 25 MCG Tab Take 1 Tab by mouth Every morning on an empty stomach. 90 Tab 1   • ALPRAZolam (XANAX) 0.5 MG Tab Take 0.5 mg by mouth at bedtime as needed for Sleep.     • medroxyPROGESTERone (DEPO-PROVERA) 150 MG/ML Suspension Prefilled Syringe 150 mg by Intramuscular route Once.       No current Epic-ordered facility-administered medications on file.       Past Medical, Social, and Family history reviewed and updated in EPIC    ------------------------------------------------------------------    Vitals:    06/22/21 1640   Resp: 16       PHYSICAL EXAM:   Psych:  A&O x 3, mood and affect appropriate  Constitutional: no distress  Skin: No apparent rashes  ENMT: Lips without lesions. Phonation normal.  Respiratory: Unlabored respiratory effort      ---------------------------------------------------------------------    ASSESSMENT:   1. Parvovirus infection  REFERRAL TO INFECTIOUS DISEASE   2.   Reported history of \"T-cell canal rearrangement\" REFERRAL TO HEMATOLOGY ONCOLOGY   3. Chronic fatigue     4. Anxiety            MEDICAL DECISION MAKING: DISCUSSION / STATUS / PLAN:    At the present time we do not have any medical record at the present time for review.  I have asked the patient to try to obtain medical records from her previous physician for further review. Pt requests referral to see [facility] for 2nd opinion.    For the parvovirus infection history >> refer the patient to infectious disease specialist.    For the T-cell condition I have referred the patient to hematology for further evaluation.    Awaiting medical records from previous physician for review.     "         -If any problems should arise, patient was advised to contact our office or go to ER to be evaluated.    Please note that this dictation was created using voice recognition software. I have made every reasonable attempt to correct obvious errors, but it is possible there are errors of grammar and possibly content that I did not discover before finalizing the note.

## 2021-06-23 NOTE — ASSESSMENT & PLAN NOTE
"Today is a new visit with the patient  Patient reported that she has had chronic fatigue for several months associated with diffuse myalgia arthralgia.  Patient reported that she has had extensive work-up done ordered by her previous specialist Dr. Cecilio Sepulveda outside of [facility]  Patient also underwent lumbar puncture recently    We do not have any medical record for review at this time    Patient stated that she was diagnosed with T-cell canal rearrangement and parvovirus infection.    The patient stated that she was supposed to get \"treatment\" however patient stated that additional testing needs to be done before treatment can be given.    Patient stated that she became frustrated with the waiting time and wishes to seek a second opinion from [facility].    Past medical history is significant for chronic anxiety for which she is seeing behavioral health specialist at the present time.  Patient presently taking alprazolam Wellbutrin Prozac.    She also has hypothyroidism currently taking levothyroxine and Cytomel.    Cool Lumens the patient does not smoke or drink alcohol.  Denies illicit drug use.  "

## 2023-08-29 PROBLEM — E03.9 ACQUIRED HYPOTHYROIDISM: Status: ACTIVE | Noted: 2021-08-23

## 2023-08-29 PROBLEM — Z00.00 ENCOUNTER FOR MEDICAL EXAMINATION TO ESTABLISH CARE: Status: ACTIVE | Noted: 2023-08-29

## 2023-08-29 PROBLEM — R73.9 HYPERGLYCEMIA: Status: ACTIVE | Noted: 2023-08-29

## 2023-08-29 PROBLEM — R79.89 HIGH SERUM LOW-DENSITY LIPOPROTEIN (LDL): Status: ACTIVE | Noted: 2023-08-29

## 2023-08-29 PROBLEM — J02.9 ACUTE PHARYNGITIS: Status: ACTIVE | Noted: 2021-08-02

## 2023-08-29 PROBLEM — R09.89 OTHER SPECIFIED SYMPTOMS AND SIGNS INVOLVING THE CIRCULATORY AND RESPIRATORY SYSTEMS: Status: ACTIVE | Noted: 2021-08-02

## 2023-08-29 PROBLEM — Z79.899 HIGH RISK MEDICATION USE: Status: ACTIVE | Noted: 2023-08-29

## 2024-02-14 PROBLEM — J02.9 ACUTE PHARYNGITIS: Status: RESOLVED | Noted: 2021-08-02 | Resolved: 2024-02-14

## 2024-02-14 PROBLEM — A69.20 LYME DISEASE: Status: ACTIVE | Noted: 2024-02-14

## 2024-02-14 PROBLEM — Z00.00 ENCOUNTER FOR MEDICAL EXAMINATION TO ESTABLISH CARE: Status: RESOLVED | Noted: 2023-08-29 | Resolved: 2024-02-14

## 2024-02-14 PROBLEM — B34.3 PARVOVIRUS INFECTION: Status: RESOLVED | Noted: 2021-06-22 | Resolved: 2024-02-14
